# Patient Record
Sex: MALE | Race: OTHER
[De-identification: names, ages, dates, MRNs, and addresses within clinical notes are randomized per-mention and may not be internally consistent; named-entity substitution may affect disease eponyms.]

---

## 2019-10-17 ENCOUNTER — HOSPITAL ENCOUNTER (EMERGENCY)
Dept: HOSPITAL 72 - EMR | Age: 29
Discharge: LEFT BEFORE DISCHARGE | End: 2019-10-17
Payer: SELF-PAY

## 2019-10-17 VITALS — SYSTOLIC BLOOD PRESSURE: 102 MMHG | DIASTOLIC BLOOD PRESSURE: 68 MMHG

## 2019-10-17 VITALS — WEIGHT: 160 LBS | BODY MASS INDEX: 23.7 KG/M2 | HEIGHT: 69 IN

## 2019-10-17 DIAGNOSIS — R10.9: ICD-10-CM

## 2019-10-17 DIAGNOSIS — R11.10: Primary | ICD-10-CM

## 2019-10-17 LAB
ADD MANUAL DIFF: NO
ALBUMIN SERPL-MCNC: 4.1 G/DL (ref 3.4–5)
ALBUMIN/GLOB SERPL: 1 {RATIO} (ref 1–2.7)
ALP SERPL-CCNC: 86 U/L (ref 46–116)
ALT SERPL-CCNC: 37 U/L (ref 12–78)
ANION GAP SERPL CALC-SCNC: 8 MMOL/L (ref 5–15)
APPEARANCE UR: CLEAR
APTT BLD: 28 SEC (ref 23–33)
APTT PPP: (no result) S
AST SERPL-CCNC: 27 U/L (ref 15–37)
BASOPHILS NFR BLD AUTO: 0.5 % (ref 0–2)
BILIRUB SERPL-MCNC: 0.8 MG/DL (ref 0.2–1)
BUN SERPL-MCNC: 11 MG/DL (ref 7–18)
CALCIUM SERPL-MCNC: 9.1 MG/DL (ref 8.5–10.1)
CHLORIDE SERPL-SCNC: 107 MMOL/L (ref 98–107)
CO2 SERPL-SCNC: 28 MMOL/L (ref 21–32)
CREAT SERPL-MCNC: 0.7 MG/DL (ref 0.55–1.3)
EOSINOPHIL NFR BLD AUTO: 0.6 % (ref 0–3)
ERYTHROCYTE [DISTWIDTH] IN BLOOD BY AUTOMATED COUNT: 11.1 % (ref 11.6–14.8)
GLOBULIN SER-MCNC: 4 G/DL
GLUCOSE UR STRIP-MCNC: NEGATIVE MG/DL
HCT VFR BLD CALC: 46.4 % (ref 42–52)
HGB BLD-MCNC: 15.9 G/DL (ref 14.2–18)
INR PPP: 1 (ref 0.9–1.1)
KETONES UR QL STRIP: NEGATIVE
LEUKOCYTE ESTERASE UR QL STRIP: NEGATIVE
LYMPHOCYTES NFR BLD AUTO: 11 % (ref 20–45)
MCV RBC AUTO: 92 FL (ref 80–99)
MONOCYTES NFR BLD AUTO: 7.1 % (ref 1–10)
NEUTROPHILS NFR BLD AUTO: 80.8 % (ref 45–75)
NITRITE UR QL STRIP: NEGATIVE
PH UR STRIP: 8 [PH] (ref 4.5–8)
PLATELET # BLD: 215 K/UL (ref 150–450)
POTASSIUM SERPL-SCNC: 4 MMOL/L (ref 3.5–5.1)
PROT UR QL STRIP: NEGATIVE
RBC # BLD AUTO: 5.04 M/UL (ref 4.7–6.1)
SODIUM SERPL-SCNC: 143 MMOL/L (ref 136–145)
SP GR UR STRIP: 1.01 (ref 1–1.03)
UROBILINOGEN UR-MCNC: NORMAL MG/DL (ref 0–1)
WBC # BLD AUTO: 11.6 K/UL (ref 4.8–10.8)

## 2019-10-17 PROCEDURE — 85730 THROMBOPLASTIN TIME PARTIAL: CPT

## 2019-10-17 PROCEDURE — 85610 PROTHROMBIN TIME: CPT

## 2019-10-17 PROCEDURE — 83690 ASSAY OF LIPASE: CPT

## 2019-10-17 PROCEDURE — 96374 THER/PROPH/DIAG INJ IV PUSH: CPT

## 2019-10-17 PROCEDURE — 80307 DRUG TEST PRSMV CHEM ANLYZR: CPT

## 2019-10-17 PROCEDURE — 74018 RADEX ABDOMEN 1 VIEW: CPT

## 2019-10-17 PROCEDURE — 96375 TX/PRO/DX INJ NEW DRUG ADDON: CPT

## 2019-10-17 PROCEDURE — 85025 COMPLETE CBC W/AUTO DIFF WBC: CPT

## 2019-10-17 PROCEDURE — 80053 COMPREHEN METABOLIC PANEL: CPT

## 2019-10-17 PROCEDURE — 81003 URINALYSIS AUTO W/O SCOPE: CPT

## 2019-10-17 PROCEDURE — 96361 HYDRATE IV INFUSION ADD-ON: CPT

## 2019-10-17 PROCEDURE — 99284 EMERGENCY DEPT VISIT MOD MDM: CPT

## 2019-10-17 NOTE — NUR
ED Nurse Note:pt. refused CT scan of abdomen and decided to leave AMA, he was 
instructed about possible outcomes by ER PA, then pt. signed AMA form and left 
ER with steady gait friend and all personal belongings

## 2019-10-17 NOTE — EMERGENCY ROOM REPORT
History of Present Illness


General


Chief Complaint:  Vomiting


Source:  Patient





Present Illness


HPI


29-year-old male with no symptom diffuse abdominal pain, and multiple bouts of 

emesis reporting tinged this morning.  Complains of diarrhea however denies 

blood in stool.  Denies fever and chills, cough and congestion, chest pain, 

shortness of breath, palpitation, urinary symptoms.  Patient sitting 

comfortably with stable vital signs.  Reports that, tobacco smoke, and other 

drug use denies recent travel, or sick contact.  Patient elicits pain on 

bilateral lower quadrants of abdomen.  After blood work and x-rays as well as 

fluids he decided to leave AGAINST MEDICAL ADVICE as he does not want to have 

the CT scan of the abdomen done as he does not want to get a bill later as well 

as he does not feel that it is necessary for him to have one.  Patient 

understands the risks of not having CT scan of abdomen done, risking possible 

appendicitis, or other organic causes of his symptoms.  Patient is a full 

judgment, awake and alert when signing AMA


Allergies:  


Coded Allergies:  


     No Known Allergies (Unverified , 10/17/19)





Patient History


Past Medical History:  see triage record


Past Surgical History:  unable to obtain


Pertinent Family History:  none


Immunizations:  UTD


Reviewed Nursing Documentation:  PMH: Agreed; PSxH: Agreed





Nursing Documentation-PMH


Past Medical History:  No Stated History





Review of Systems


All Other Systems:  negative except mentioned in HPI





Physical Exam





Vital Signs








  Date Time  Temp Pulse Resp B/P (MAP) Pulse Ox O2 Delivery O2 Flow Rate FiO2


 


10/17/19 13:30 98.2 67 18 102/68 (79) 95 Room Air  








Sp02 EP Interpretation:  reviewed, normal


General Appearance:  no apparent distress, alert, GCS 15, non-toxic


Head:  normocephalic, atraumatic


Eyes:  bilateral eye normal inspection, bilateral eye PERRL


ENT:  hearing grossly normal, normal pharynx, no angioedema, normal voice


Neck:  full range of motion, supple/symm/no masses


Respiratory:  chest non-tender, lungs clear, normal breath sounds, no rhonchi, 

no wheezing, speaking full sentences


Cardiovascular #1:  regular rate, rhythm, no edema, no murmur, normal capillary 

refill


Gastrointestinal:  normal bowel sounds, non tender, soft, no bruit, non-

distended, no guarding, no rebound, guarding - LLQ and RLQ


Genitourinary:  no CVA tenderness


Musculoskeletal:  back normal, gait/station normal, normal range of motion, non-

tender, no calf tenderness


Neurologic:  normal inspection, alert, oriented x3


Psychiatric:  normal inspection, judgement/insight normal


Skin:  no rash


Lymphatic:  normal inspection, no adenopathy





Medical Decision Making


PA Attestation


All my diagnosis and treatment plans were reviewed ad discussed with my 

supervising physician Dr. Angela


Diagnostic Impression:  


 Primary Impression:  


 Vomiting


 Additional Impressions:  


 Abdominal pain


 Left against medical advice


ER Course


29-year-old male with no symptom diffuse abdominal pain, and multiple bouts of 

emesis reporting tinged this morning.  Complains of diarrhea however denies 

blood in stool.  Denies fever and chills, cough and congestion, chest pain, 

shortness of breath, palpitation, urinary symptoms.  Patient sitting 

comfortably with stable vital signs.  Reports that, tobacco smoke, and other 

drug use denies recent travel, or sick contact.  Patient elicits pain on 

bilateral lower quadrants of abdomen.  After blood work and x-rays as well as 

fluids he decided to leave AGAINST MEDICAL ADVICE as he does not want to have 

the CT scan of the abdomen done as he does not want to get a bill later as well 

as he does not feel that it is necessary for him to have one.  Patient 

understands the risks of not having CT scan of abdomen done, risking possible 

appendicitis, or other organic causes of his symptoms.  Patient is a full 

judgment, awake and alert when signing AMA





Ddx considered but are not limited to: appendicitis, cholecystis, gastritis, 

gastroenteritis, UTI, pyelonephritis, SBO, diverticulitis, influenza with GI 

manifestation,














Vital signs: are WNL, pt. is afebrile








 H&PE are most consistent with: vomiting and abdominal pain














ORDERS: abdominal CT, abdominal pain set, EKG, abdominal US














ED INTERVENTIONS: NS bolus




















DISCHARGE: At this time pt. is stable for d/c to home. Will provide printed 

patient care instructions, and any necessary prescriptions. Care plan and 

follow up instructions have been discussed with the patient prior to 

discharge.pt left AMA


Chest X-Ray Diagnostic Results


Chest X-Ray Diagnostic Results :  


   Chest X-Ray Ordered:  Yes


   # of Views/Limited/Complete:  1 View


   Indication:  Other


   EP Interpretation:  Yes


   PA Xray:  Interpretation reviewed, by supervising MD, and agrees with 

findings.


   Interpretation:  no consolidation, no effusion, no pneumothorax


   Impression:  No acute disease


   Electronically Signed by:  Yg Kinney PA-C





Other X-Ray Diagnostic Results


Other X-Ray Diagnostic Results :  


   # of Views/Limited Vs Complete:  2 View





Last Vital Signs








  Date Time  Temp Pulse Resp B/P (MAP) Pulse Ox O2 Delivery O2 Flow Rate FiO2


 


10/17/19 14:45 98.2       


 


10/17/19 13:51  67 18 102/68 95 Room Air  








Disposition:  AGAINST MEDICAL ADVICE


Referrals:  


NOT CHOSEN IPA/MD,REFERRING (PCP)


Patient Instructions:  Nausea and Vomiting, Adult





Additional Instructions:  


Due to your refusing CT scan we are unable to determine further infection of 

appendix versus other internal bloody vomit as well as pain.  You need to follow

-up with your primary care within the next 24 to 48 hours for imaging as well 

as referral to gastroenterologist.  You agreed to sign AGAINST MEDICAL ADVICE 

understanding the risks of not having the imaging done as a prevents us from 

having a proper treatment for you.











Yg Zaragoza Oct 17, 2019 15:28